# Patient Record
Sex: FEMALE | Race: WHITE | NOT HISPANIC OR LATINO | URBAN - METROPOLITAN AREA
[De-identification: names, ages, dates, MRNs, and addresses within clinical notes are randomized per-mention and may not be internally consistent; named-entity substitution may affect disease eponyms.]

---

## 2024-04-25 ENCOUNTER — OFFICE VISIT (OUTPATIENT)
Dept: DERMATOLOGY | Facility: CLINIC | Age: 59
End: 2024-04-25
Payer: COMMERCIAL

## 2024-04-25 DIAGNOSIS — L81.4 LENTIGO: ICD-10-CM

## 2024-04-25 DIAGNOSIS — L71.9 ROSACEA: ICD-10-CM

## 2024-04-25 DIAGNOSIS — D48.5 NEOPLASM OF UNCERTAIN BEHAVIOR OF SKIN: ICD-10-CM

## 2024-04-25 DIAGNOSIS — D18.01 HEMANGIOMA OF SKIN: ICD-10-CM

## 2024-04-25 DIAGNOSIS — Z85.828 PERSONAL HISTORY OF OTHER MALIGNANT NEOPLASM OF SKIN: Primary | ICD-10-CM

## 2024-04-25 DIAGNOSIS — L82.1 SEBORRHEIC KERATOSIS: ICD-10-CM

## 2024-04-25 DIAGNOSIS — L57.8 PHOTOAGING OF SKIN: ICD-10-CM

## 2024-04-25 PROCEDURE — 99214 OFFICE O/P EST MOD 30 MIN: CPT | Performed by: DERMATOLOGY

## 2024-04-25 PROCEDURE — 88305 TISSUE EXAM BY PATHOLOGIST: CPT | Performed by: DERMATOLOGY

## 2024-04-25 PROCEDURE — 11302 SHAVE SKIN LESION 1.1-2.0 CM: CPT | Performed by: DERMATOLOGY

## 2024-04-25 RX ORDER — METRONIDAZOLE 7.5 MG/G
CREAM TOPICAL
Qty: 45 G | Refills: 2 | Status: SHIPPED | OUTPATIENT
Start: 2024-04-25

## 2024-04-25 RX ORDER — RIBOFLAVIN (VITAMIN B2) 400 MG
400 TABLET ORAL
COMMUNITY
Start: 2024-01-19

## 2024-04-25 RX ORDER — ESTRADIOL AND NORETHINDRONE ACETATE 1; .5 MG/1; MG/1
1 TABLET ORAL
COMMUNITY
Start: 2024-02-16

## 2024-04-25 RX ORDER — CHOLECALCIFEROL (VITAMIN D3) 125 MCG
5000 CAPSULE ORAL
COMMUNITY
Start: 2022-11-15

## 2024-04-25 ASSESSMENT — DERMATOLOGY QUALITY OF LIFE (QOL) ASSESSMENT
RATE HOW EMOTIONALLY BOTHERED YOU ARE BY YOUR SKIN PROBLEM (FOR EXAMPLE, WORRY, EMBARRASSMENT, FRUSTRATION): 0 - NEVER BOTHERED
RATE HOW BOTHERED YOU ARE BY EFFECTS OF YOUR SKIN PROBLEMS ON YOUR ACTIVITIES (EG, GOING OUT, ACCOMPLISHING WHAT YOU WANT, WORK ACTIVITIES OR YOUR RELATIONSHIPS WITH OTHERS): 0 - NEVER BOTHERED
DATE THE QUALITY-OF-LIFE ASSESSMENT WAS COMPLETED: 66955
ARE THERE EXCLUSIONS OR EXCEPTIONS FOR THE QUALITY OF LIFE ASSESSMENT: NO
WHAT SINGLE SKIN CONDITION LISTED BELOW IS THE PATIENT ANSWERING THE QUALITY-OF-LIFE ASSESSMENT QUESTIONS ABOUT: NONE OF THE ABOVE

## 2024-04-25 ASSESSMENT — DERMATOLOGY PATIENT ASSESSMENT
DO YOU USE A TANNING BED: NO
ARE YOU TRYING TO GET PREGNANT: NO
ARE YOU AN ORGAN TRANSPLANT RECIPIENT: NO
HAVE YOU HAD OR DO YOU HAVE VASCULAR DISEASE: NO
ARE YOU ON BIRTH CONTROL: NO
DO YOU USE SUNSCREEN: DAILY
DO YOU HAVE ANY NEW OR CHANGING LESIONS: YES
DO YOU HAVE IRREGULAR MENSTRUAL CYCLES: NO
FOR PATIENTS COMING IN FOR A FOLLOW-UP VISIT - HAVE THERE BEEN ANY CHANGES IN YOUR HEALTH SINCE YOUR LAST VISIT: NO
HAVE YOU HAD OR DO YOU HAVE A STAPH INFECTION: NO

## 2024-04-25 ASSESSMENT — ITCH NUMERIC RATING SCALE: HOW SEVERE IS YOUR ITCHING?: 0

## 2024-04-25 ASSESSMENT — PATIENT GLOBAL ASSESSMENT (PGA): PATIENT GLOBAL ASSESSMENT: PATIENT GLOBAL ASSESSMENT:  2 - MILD

## 2024-04-25 NOTE — PROGRESS NOTES
Subjective     Nany Gtz is a 58 y.o. female who presents for the following: Skin Check (Pt here for FBSE with hx of BCC, ATN and AK. Pt reports areas of concern on left upper shoulder and left chest. ).     Review of Systems:  No other skin or systemic complaints other than what is documented elsewhere in the note.    The following portions of the chart were reviewed this encounter and updated as appropriate:         Skin Cancer History  History of BCC x 3 2022 and prior - mid chest, suprabrow, right posterior leg  History of atypical nevi      Specialty Problems    None       Objective   Well appearing patient in no apparent distress; mood and affect are within normal limits.    A full examination was performed including scalp, head, eyes, ears, nose, lips, neck, chest, axillae, abdomen, back, buttocks, bilateral upper extremities, bilateral lower extremities, hands, feet, fingers, toes, fingernails, and toenails. All findings within normal limits unless otherwise noted below.    Assessment/Plan   1. Personal history of other malignant neoplasm of skin (3)  Mid chest, Right posterior leg, suprabrow  Well healed scar at site of prior treatment without evidence of recurrence.    There is no evidence of recurrence on clinical examination today, reassurance was provided to the patient. The importance of sun protection was reviewed with the patient including the use of a broad spectrum sunscreen that protects against both UVA/UVB rays, with ingredients such as Zinc oxide or titanium dioxide, wearing sun protective clothing and sun avoidance. Warning signs of non-melanoma skin cancer were reviewed. ABCDEs of melanoma reviewed. Patient to f/u should they notice any new or changing pre-existing skin lesion    2. Neoplasm of uncertain behavior of skin  Left Shoulder - Anterior  0.7 x 0.6cm erythematous scaly patch          Shave removal    Lesion length (cm):  0.7  Lesion width (cm):  0.6  Margin per side (cm):   0.3  Lesion diameter (cm):  1.2  Informed consent: discussed and consent obtained    Timeout: patient name, date of birth, surgical site, and procedure verified    Procedure prep:  Patient was prepped and draped  Anesthesia: the lesion was anesthetized in a standard fashion    Anesthetic:  1% lidocaine w/ epinephrine 1-100,000 local infiltration  Instrument used: DermaBlade    Hemostasis achieved with: electrodesiccation    Outcome: patient tolerated procedure well    Post-procedure details: sterile dressing applied and wound care instructions given    Dressing type: bandage and petrolatum      Staff Communication: Dermatology Local Anesthesia: 1 % Lidocaine / Epinephrine - Amount:2cc    Specimen 1 - Dermatopathology- DERM LAB  Differential Diagnosis: bcc  Check Margins Yes/No?:  Yes  Comments:    Dermpath Lab: Routine Histopathology (formalin-fixed tissue)    Lesion concerning for BCC on the left shoulder.     Patient would like shave removal with margins checked. If present on margin then plan for ED&C, however if margins clear will defer further treatment.    The lesion will be traded for a scar and sent for pathology.  The risks include incomplete removal, repigmentation of the lesion after removal.    3. Photoaging of skin  Mottled pigmentation with telangiectasias and brown reticular macules in sun exposed areas of the body.    The risk of chronic, cumulative sun damage and risk of development of skin cancer was reviewed today.   The importance of sun protection was reviewed: including the use of a broad spectrum sunscreen that protects against both UVA/UVB rays, with ingredients such as Zinc oxide or titanium dioxide, wearing sun protective clothing and sun avoidance. We reviewed the warning signs of non-melanoma skin cancer and ABCDEs of melanoma  Please follow up should you notice any new or changing pre-existing skin lesion.    4. Rosacea  Head - Anterior (Face)  Mid face erythema with telangiectasias and  scattered inflammatory papules.    The chronic and intermittently flaring nature of the skin condition was discussed with the patient today. Discussed common triggers associated with rosacea including sun exposure, spicy foods, alcohol, and stress. The various treatment options and risks and benefits reviewed. Patient to begin metronidazole 0.75% cream 2x daily    metroNIDAZOLE (Metrocream) 0.75 % cream - Head - Anterior (Face)  Apply to face 2x daily    5. Seborrheic keratosis  Brown, tan waxy macules and stuck on appearing papules and plaques    The benign nature of these skin lesions reviewed, reassure provided and no further treatment needed at this time.   These lesions can be removed, if symptomatic (itching, bleeding, rubbing on clothing, painful), otherwise removal is considered cosmetic.     Discussed cost for cosmetic removal of lesions (on the abdomen, inframammary area)    6. Hemangioma of skin  Cherry red papules    The benign nature of these skin lesions were reviewed, no treatment is necessary.   Please follow up for any new or pre-existing lesion that is changing in size, shape, color, becomes painful, tender, itches or bleed.    7. Lentigo  Scattered tan macules in sun-exposed areas.    These are benign skin lesions due to sun exposure. They will darken in response to sun exposure. They should be monitored for change in size, shape or color.  These lesions can be treated cosmetically with topical creams, liquid nitrogen and a variety of lasers.      Follow up in 1 year

## 2024-04-29 LAB
LABORATORY COMMENT REPORT: NORMAL
PATH REPORT.FINAL DX SPEC: NORMAL
PATH REPORT.GROSS SPEC: NORMAL
PATH REPORT.MICROSCOPIC SPEC OTHER STN: NORMAL
PATH REPORT.RELEVANT HX SPEC: NORMAL
PATH REPORT.TOTAL CANCER: NORMAL

## 2024-09-26 ENCOUNTER — APPOINTMENT (OUTPATIENT)
Dept: DERMATOLOGY | Facility: CLINIC | Age: 59
End: 2024-09-26
Payer: COMMERCIAL

## 2024-09-26 DIAGNOSIS — R20.8 SKIN PAIN: ICD-10-CM

## 2024-09-26 DIAGNOSIS — L91.0 KELOID: Primary | ICD-10-CM

## 2024-09-26 PROCEDURE — 11900 INJECT SKIN LESIONS </W 7: CPT | Performed by: DERMATOLOGY

## 2024-09-26 RX ORDER — TRIAMCINOLONE ACETONIDE 40 MG/ML
20 INJECTION, SUSPENSION INTRA-ARTICULAR; INTRAMUSCULAR ONCE
Status: COMPLETED | OUTPATIENT
Start: 2024-09-26 | End: 2024-09-26

## 2024-09-26 ASSESSMENT — DERMATOLOGY QUALITY OF LIFE (QOL) ASSESSMENT
RATE HOW BOTHERED YOU ARE BY SYMPTOMS OF YOUR SKIN PROBLEM (EG, ITCHING, STINGING BURNING, HURTING OR SKIN IRRITATION): 2
RATE HOW EMOTIONALLY BOTHERED YOU ARE BY YOUR SKIN PROBLEM (FOR EXAMPLE, WORRY, EMBARRASSMENT, FRUSTRATION): 2
RATE HOW BOTHERED YOU ARE BY EFFECTS OF YOUR SKIN PROBLEMS ON YOUR ACTIVITIES (EG, GOING OUT, ACCOMPLISHING WHAT YOU WANT, WORK ACTIVITIES OR YOUR RELATIONSHIPS WITH OTHERS): 2
WHAT SINGLE SKIN CONDITION LISTED BELOW IS THE PATIENT ANSWERING THE QUALITY-OF-LIFE ASSESSMENT QUESTIONS ABOUT: KELOIDS
ARE THERE EXCLUSIONS OR EXCEPTIONS FOR THE QUALITY OF LIFE ASSESSMENT: NO

## 2024-09-26 ASSESSMENT — DERMATOLOGY PATIENT ASSESSMENT
WHERE ARE THESE NEW OR CHANGING LESIONS LOCATED: LEFT SHOULDER
DO YOU USE SUNSCREEN: DAILY
FOR PATIENTS COMING IN FOR A FOLLOW-UP VISIT - HAVE THERE BEEN ANY CHANGES IN YOUR HEALTH SINCE YOUR LAST VISIT: NO
DO YOU USE A TANNING BED: NO
HAVE YOU HAD OR DO YOU HAVE A STAPH INFECTION: NO
ARE YOU TRYING TO GET PREGNANT: NO
DO YOU HAVE ANY NEW OR CHANGING LESIONS: YES
ARE YOU AN ORGAN TRANSPLANT RECIPIENT: NO

## 2024-09-26 ASSESSMENT — PATIENT GLOBAL ASSESSMENT (PGA): PATIENT GLOBAL ASSESSMENT: PATIENT GLOBAL ASSESSMENT:  2 - MILD

## 2024-09-26 ASSESSMENT — ITCH NUMERIC RATING SCALE: HOW SEVERE IS YOUR ITCHING?: 3

## 2024-09-26 NOTE — PROGRESS NOTES
"Subjective     Nany Gtz is a 59 y.o. female who presents for the following: lesion (Pt here for what she believes is a keloid from previous biopsy (4/25/24). Left shoulder-anterior, superficial basal cell carcinoma, approximately 0.2mm from deep margin. Pt states area is raised hard and painful.).     Review of Systems:  No other skin or systemic complaints other than what is documented elsewhere in the note.    The following portions of the chart were reviewed this encounter and updated as appropriate:          Skin Cancer History  Biopsy Date Type Location Status   4/25/24 BCC, Superficial Left Shoulder - Anterior Treatment Complete  4/30/24       Specialty Problems    None       Objective   Well appearing patient in no apparent distress; mood and affect are within normal limits.    A focused skin examination was performed of the left shoulder. All findings within normal limits unless otherwise noted below.    Assessment/Plan   1. Keloid  Left Shoulder - Anterior  Round thickened pink plaque at biopsy site    Keloids are thick, raised scars that often occur secondary to trauma, surgery, piercings and at times may occur spontaneously.  Treatments are limited, however often do respond to intralesional kenalog(ILK). Keloids often require multiple intralesional kenalog treatments  Occasionally keloids can \"removed\" surgically, however they have a risk of recurrence and still often require intralesional kenalog after removal to prevent recurrence.     Intralesional kenalog (ILK) treatment may require multiple treatments and may cause skin discoloration, atrophy (thinning) of the skin.      Intralesional injection - Left Shoulder - Anterior  Intralesional Injection:   Date/Time: 9/26/2024 1:54 PM    Consent:     Consent obtained:  Verbal    Consent given by:  Patient    Risks discussed:  Pain and bleeding    Alternatives discussed:  No treatment and observation  Pre-Procedure Details:     Prep Type:  Isopropyl " alcohol  Procedure Details:   Injection:  Triamcinolone  Outcome: patient tolerated procedure well  Comments:  A total of 1 lesions were injected.  0.2 mL of 20mg/ml were injected.    triamcinolone acetonide (Kenalog-40) injection 20 mg - Left Shoulder - Anterior          Follow up in 1 month

## 2024-10-23 ENCOUNTER — APPOINTMENT (OUTPATIENT)
Dept: DERMATOLOGY | Facility: CLINIC | Age: 59
End: 2024-10-23
Payer: COMMERCIAL

## 2024-11-08 ENCOUNTER — APPOINTMENT (OUTPATIENT)
Dept: DERMATOLOGY | Facility: CLINIC | Age: 59
End: 2024-11-08
Payer: COMMERCIAL

## 2024-11-08 DIAGNOSIS — L91.0 KELOID: Primary | ICD-10-CM

## 2024-11-08 DIAGNOSIS — R20.8 SKIN PAIN: ICD-10-CM

## 2024-11-08 PROCEDURE — 11900 INJECT SKIN LESIONS </W 7: CPT | Performed by: DERMATOLOGY

## 2024-11-08 RX ORDER — TRIAMCINOLONE ACETONIDE 40 MG/ML
40 INJECTION, SUSPENSION INTRA-ARTICULAR; INTRAMUSCULAR ONCE
Status: COMPLETED | OUTPATIENT
Start: 2024-11-08 | End: 2024-11-08

## 2024-11-08 NOTE — PROGRESS NOTES
"Subjective     Nany Gtz is a 59 y.o. female who presents for the following: Keloid (Last visit 9/26/24 lesion was injected with kenalog 20mg/ml. Patient states initially better but then now worse, tender and painful and more raised. ).     Review of Systems:  No other skin or systemic complaints other than what is documented elsewhere in the note.    The following portions of the chart were reviewed this encounter and updated as appropriate:          Skin Cancer History  Biopsy Date Type Location Status   4/25/24 BCC, Superficial Left Shoulder - Anterior Treatment Complete  4/30/24       Specialty Problems    None       Objective   Well appearing patient in no apparent distress; mood and affect are within normal limits.    A focused skin examination was performed of the left shoulder. All findings within normal limits unless otherwise noted below.    Assessment/Plan   1. Keloid  Left Shoulder - Anterior  Round thickened pink plaque at biopsy site    Keloids are thick, raised scars that often occur secondary to trauma, surgery, piercings and at times may occur spontaneously.  Treatments are limited, however often do respond to intralesional kenalog(ILK). Keloids often require multiple intralesional kenalog treatments  Occasionally keloids can \"removed\" surgically, however they have a risk of recurrence and still often require intralesional kenalog after removal to prevent recurrence.     Intralesional kenalog (ILK) treatment may require multiple treatments and may cause skin discoloration, atrophy (thinning) of the skin.      Intralesional injection - Left Shoulder - Anterior  Intralesional Injection:   Date/Time: 11/8/2024 1:24 PM    Consent:     Consent obtained:  Verbal    Consent given by:  Patient    Risks discussed:  Pain and bleeding    Alternatives discussed:  No treatment and observation  Pre-Procedure Details:     Prep Type:  Isopropyl alcohol  Procedure Details:   Injection:  Triamcinolone  Outcome: " patient tolerated procedure well  Comments:  A total of 1 lesions were injected.  0.2 mL of 40mg/ml were injected.    triamcinolone acetonide (Kenalog-40) injection 40 mg - Left Shoulder - Anterior      Related Procedures  Follow Up In Dermatology - Established Patient

## 2024-12-03 PROBLEM — C44.91 BCC (BASAL CELL CARCINOMA OF SKIN): Status: ACTIVE | Noted: 2017-11-09

## 2024-12-04 ENCOUNTER — APPOINTMENT (OUTPATIENT)
Dept: DERMATOLOGY | Facility: CLINIC | Age: 59
End: 2024-12-04
Payer: COMMERCIAL

## 2024-12-04 DIAGNOSIS — R20.8 SKIN PAIN: ICD-10-CM

## 2024-12-04 DIAGNOSIS — L91.0 KELOID: Primary | ICD-10-CM

## 2024-12-04 PROCEDURE — 11900 INJECT SKIN LESIONS </W 7: CPT | Performed by: DERMATOLOGY

## 2024-12-04 RX ORDER — TRIAMCINOLONE ACETONIDE 40 MG/ML
40 INJECTION, SUSPENSION INTRA-ARTICULAR; INTRAMUSCULAR ONCE
Status: COMPLETED | OUTPATIENT
Start: 2024-12-04 | End: 2024-12-04

## 2024-12-04 NOTE — PROGRESS NOTES
"Subjective     Nany Gtz is a 59 y.o. female who presents for the following: Keloid (Follow up - Patient states keloid (left shoulder) feels better with a little itching, but looks the same.).     Review of Systems:  No other skin or systemic complaints other than what is documented elsewhere in the note.    The following portions of the chart were reviewed this encounter and updated as appropriate:          Skin Cancer History  Biopsy Date Type Location Status   4/25/24 BCC, Superficial Left Shoulder - Anterior Treatment Complete  4/30/24       Specialty Problems          Dermatology Problems    BCC (basal cell carcinoma of skin)        Objective   Well appearing patient in no apparent distress; mood and affect are within normal limits.    A focused skin examination was performed. All findings within normal limits unless otherwise noted below.    Assessment/Plan   1. Keloid  Left Shoulder - Anterior  Round thickened pink plaque at biopsy site    Keloids are thick, raised scars that often occur secondary to trauma, surgery, piercings and at times may occur spontaneously.  Treatments are limited, however often do respond to intralesional kenalog(ILK). Keloids often require multiple intralesional kenalog treatments  Occasionally keloids can \"removed\" surgically, however they have a risk of recurrence and still often require intralesional kenalog after removal to prevent recurrence.     Intralesional kenalog (ILK) treatment may require multiple treatments and may cause skin discoloration, atrophy (thinning) of the skin.    Elected for ILK # 3, (#2 with K40mg/ml)     Intralesional injection - Left Shoulder - Anterior  Intralesional Injection:   Date/Time: 12/4/2024 12:58 PM    Consent:     Consent obtained:  Verbal    Consent given by:  Patient    Risks discussed:  Pain and bleeding    Alternatives discussed:  No treatment and observation  Pre-Procedure Details:     Prep Type:  Isopropyl alcohol  Procedure " Details:   Injection:  Triamcinolone  Outcome: patient tolerated procedure well  Comments:  A total of 1 lesions were injected.  0.3 mL of 40mg/ml were injected.    triamcinolone acetonide (Kenalog-40) injection 40 mg - Left Shoulder - Anterior      Prior Authorization for Laser destruction of cutaneous vascular proliferative lesions - Left Shoulder - Anterior    Of note patient mentioned benign keratoses on the abdomen - recommended LN2 and advised lower risk of development of keloids      Follow up in 1 month.

## 2025-01-10 ENCOUNTER — APPOINTMENT (OUTPATIENT)
Dept: DERMATOLOGY | Facility: CLINIC | Age: 60
End: 2025-01-10
Payer: COMMERCIAL

## 2025-01-10 DIAGNOSIS — L82.0 INFLAMED SEBORRHEIC KERATOSIS: ICD-10-CM

## 2025-01-10 DIAGNOSIS — L91.0 KELOID: Primary | ICD-10-CM

## 2025-01-10 DIAGNOSIS — R20.8 SKIN PAIN: ICD-10-CM

## 2025-01-10 DIAGNOSIS — L81.4 LENTIGO: ICD-10-CM

## 2025-01-10 RX ORDER — TRIAMCINOLONE ACETONIDE 40 MG/ML
40 INJECTION, SUSPENSION INTRA-ARTICULAR; INTRAMUSCULAR ONCE
Status: COMPLETED | OUTPATIENT
Start: 2025-01-10 | End: 2025-01-10

## 2025-01-10 RX ADMIN — TRIAMCINOLONE ACETONIDE 40 MG: 40 INJECTION, SUSPENSION INTRA-ARTICULAR; INTRAMUSCULAR at 13:08

## 2025-01-10 NOTE — PROGRESS NOTES
"Subjective     Nany Gtz is a 59 y.o. female who presents for the following: Keloid (1 month follow up LV 12/04/24 Patient states keloid has had some improvement.). Patient reports some pruritic lesions on the abdomen and back. She also has a brown spot on the right cheek that is no changing or symptomatic.    Review of Systems:  No other skin or systemic complaints other than what is documented elsewhere in the note.    The following portions of the chart were reviewed this encounter and updated as appropriate:          Skin Cancer History  Biopsy Date Type Location Status   4/25/24 BCC, Superficial Left Shoulder - Anterior Treatment Complete  4/30/24       Specialty Problems          Dermatology Problems    BCC (basal cell carcinoma of skin)        Objective   Well appearing patient in no apparent distress; mood and affect are within normal limits.    A focused skin examination was performed of the left shoulder, abdomen, back, face. All findings within normal limits unless otherwise noted below.    Assessment/Plan   1. Keloid  Left Shoulder - Anterior  Round thickened pink plaque at biopsy site    Keloids are thick, raised scars that often occur secondary to trauma, surgery, piercings and at times may occur spontaneously.  Treatments are limited, however often do respond to intralesional kenalog(ILK). Keloids often require multiple intralesional kenalog treatments  Occasionally keloids can \"removed\" surgically, however they have a risk of recurrence and still often require intralesional kenalog after removal to prevent recurrence.     Intralesional kenalog (ILK) treatment may require multiple treatments and may cause skin discoloration, atrophy (thinning) of the skin.    Elected for ILK # 4, (#2 and #3 with K40mg/ml)     Pending prior authorization approval of laser for treatment of keloid. Patient is scheduled for laser treatment on 2/25/25    Intralesional injection - Left Shoulder - " "Anterior  Intralesional Injection:   Consent:     Consent obtained:  Verbal    Consent given by:  Patient    Risks discussed:  Pain and bleeding    Alternatives discussed:  No treatment and observation  Pre-Procedure Details:     Prep Type:  Isopropyl alcohol  Procedure Details:   Injection:  Triamcinolone  Outcome: patient tolerated procedure well  Comments:  A total of 1 lesions were injected.  0.2 mL of 40mg/ml were injected.    triamcinolone acetonide (Kenalog-40) injection 40 mg - Left Shoulder - Anterior      Related Procedures  Prior Authorization for Laser destruction of cutaneous vascular proliferative lesions    2. Lentigo  Right Malar Cheek  Scattered tan macules in sun-exposed areas.    These are benign skin lesions due to sun exposure. They will darken in response to sun exposure. They should be monitored for change in size, shape or color.  These lesions can be treated cosmetically with topical creams, liquid nitrogen and a variety of lasers.    Patient elected for LN2 of this lesion. This was performed as courtesy.    The risks and benefits of LN2 were reviewed including incomplete removal, crusting, blister hypo and/or hyperpigmentation, scarring and recurrence.      Destr of lesion - Right Malar Cheek  Complexity: simple    Destruction method: cryotherapy    Informed consent: discussed and consent obtained    Lesion destroyed using liquid nitrogen: Yes    Cryotherapy cycles:  1  Outcome: patient tolerated procedure well with no complications    Post-procedure details: wound care instructions given      3. Inflamed seborrheic keratosis (7)  Left Abdomen (side) - Upper (4), Left Lower Back, Right Breast, Right Lower Back  pink and brown \"stuck on\" verrucous scaly papule with surrounding erythema    The benign nature of these skin lesions reviewed, reassure provided and no further treatment needed at this time.   These lesions can be removed, if symptomatic (itching, bleeding, rubbing on clothing, " painful), otherwise removal is considered cosmetic.     Patient elected for LN2 for these lesions. See procedure note    The risks and benefits of LN2 were reviewed including incomplete removal, crusting, blister hypo and/or hyperpigmentation, scarring and recurrence.      Destr of lesion - Left Abdomen (side) - Upper (4), Left Lower Back, Right Breast, Right Lower Back  Complexity: simple    Destruction method: cryotherapy    Informed consent: discussed and consent obtained    Lesion destroyed using liquid nitrogen: Yes    Cryotherapy cycles:  2  Outcome: patient tolerated procedure well with no complications    Post-procedure details: wound care instructions given        Follow up as scheduled for laser visit    Willi Desai MD   [id number], Department of Dermatology    I was present for the entirety of the procedure(s).  I performed the procedures. I saw and evaluated the patient. I personally obtained the key and critical portions of the history and physical exam or was physically present for key and critical portions performed by the resident/fellow. I reviewed the resident/fellow's documentation and discussed the patient with the resident/fellow. I agree with the resident/fellow's medical decision making as documented in the note.    Adriana Lord DO

## 2025-01-28 NOTE — PROGRESS NOTES
Fiona Heck MD   Adult Reconstruction and Joint Replacement Surgery  Phone: [phone]     Fax: [phone]       Name: Nany Gtz  Age: 59 y.o.   : 1965   Date of Visit: 2025        INITIAL CONSULTATION    CC: Right knee pain    Clinical History:  This patient presents with 4 months of RIGHT  knee pain.     Patient does have pain at night. Patient does not report falls related to this problem. Patient is able to walk unlimited blocks. Patient is currently using nothing as assistive device. Primarily complains of medial pain. The pain is significantly impacting their ability to perform activities of daily living. Patient reports no longer able to do activities such as tennis, Pilates. Patient has difficulty with climbing stairs and getting up from a chair. Patient has tried the following Activity modification, NSAIDs, Tylenol (arthritis dosing) , and Xray. Date of last steroid injection: Never.     The patient does endorse some mechanical symptoms including stiffness and popping.  She occasionally has catching.  She does not endorse a naga locking experience.    Focused History  PMH: Reviewed and PE/DVT: no  PSH: Reviewed , Hip/Knee replacement: no, Hip/Knee surgery: no, Anesthesia complications: no, Spine surgery: no, Surgical infection: no, and Weight loss surgery: no  SHx: Reviewed, Occupation: Accounting, Current smoker: no, EtOH intake weekly: None, Social support: unk, and Preferred physical activities: Tennis, Pilates  Jehovah´s Witness: no  Meds: Reviewed, Current Anticoagulants: no, Weight loss medication: no, and Current Opioids: no  Allergies: Reviewed  and The patient reports no contraindications or allergies to cephalosporins, aspirin, NSAIDs or opioids, except as noted above.  Dental Hx: Last routine cleaning: April 15, 2025 and All invasive dental work must be completed 3+ months prior to joint replacement surgery. Dental cleaning must be completed 6+ weeks prior to  joint replacement surgery. Patient are to avoid any invasive dental work 3-6 months post-surgically.   FH: No family history of any bleeding or clotting disorders.    PROMs/HISTORY   PROMs   No questionnaires on file.     Past Medical History:   Diagnosis Date    BCC (basal cell carcinoma of skin) 11/09/2017    BCC (basal cell carcinoma of skin) 04/08/2022    BCC (basal cell carcinoma of skin) 04/25/2024       Past Medical History:   Diagnosis Date    BCC (basal cell carcinoma of skin) 11/09/2017    BCC (basal cell carcinoma of skin) 04/08/2022    BCC (basal cell carcinoma of skin) 04/25/2024     Documented in chart and reviewed.     History reviewed. No pertinent surgical history.    Allergies: She has No Known Allergies.     Medications:  Current Outpatient Medications   Medication Instructions    cholecalciferol (VITAMIN D-3) 5,000 Units, Daily RT    estradiol-norethindrone acet (ActivElla) 1-0.5 mg tablet 1 tablet, Daily RT    metroNIDAZOLE (Metrocream) 0.75 % cream Apply to face 2x daily    riboflavin (VITAMIN B-2) 400 mg, Daily RT       No family history on file.  Documented in chart and reviewed.     Social History     Tobacco Use    Smoking status: Not on file    Smokeless tobacco: Not on file   Substance Use Topics    Alcohol use: Not on file        Review of Systems: Review of systems completed with medical assistant intake. Please refer to this note.     Physical Exam:  BMI: Not assessed.    General: The patient is well appearing and has an appropriate affect.     Neurological Examination: SILT in SPN/DPN/Sural/Saphenous/Tibial nerves. 5/5 EHL, FHL, Tibial anterior, Gastrocnemius. Coordination grossly intact.     Cardiovascular Exam: Capillary refill <2 seconds.     Lymphatic Examination: There is no obvious lymphatic swelling present around the involved joint.    Skin Exam: Skin around the pertinent joint is without evidence of infection or rash.    Gait: The patient ambulates with an antalgic gait.  "    Lumbar spine:    No tenderness to palpation midline.    Negative straight leg raise bilaterally.    Right Hip Examination:    There is no tenderness over the greater trochanter.    Range of motion is full extension to 100 degrees of flexion.    The hip is stable without subluxation or dislocation.    The hip internally rotates to 15 degrees and externally rotates to 45 degrees.    There is no pain with hip motion.    Left Hip Examination:    There is no tenderness over the greater trochanter.    Range of motion is full extension to 100 degrees of flexion.    The hip is stable without subluxation or dislocation.    The hip internally rotates to 15 degrees and externally rotates to 45 degrees.    There is no pain with hip motion.    Right Knee Examination:  Examination of the knee reveals the skin to be intact.    There is a small effusion in the knee.    The alignment of the knee is neutral.    This deformity is correctable.    There is tenderness to palpation over the joint line.    There is significant quadriceps atrophy.    Range of Motion: 5 to 130 degrees of flexion.    The knee is stable to varus-valgus stress and anterior-posterior stress.     There is mild grinding with range of motion.    There is mild patellofemoral crepitus.    There is a positive Erin test.    Left Knee Examination:  Examination of the knee reveals the skin to be intact.     There is no obvious swelling.    There is a no effusion in the knee.     The alignment of the knee is normal.    There is no tenderness to palpation over the joint line.    There is no significant quadriceps atrophy.    Range of motion is full extension to 120 degrees of flexion.    The knee is stable to varus-valgus stress and anterior-posterior stress.     There is no grinding with range of motion.    There is no patellofemoral crepitus.    Prior Labs:   Prior Labs:   No results found for: \"WBC\", \"HGB\", \"HCT\", \"MCV\", \"PLT\"   No results found for: \"INR\", " "\"PROTIME\"      No results found for: \"GLUCOSE\", \"CALCIUM\", \"NA\", \"K\", \"CO2\", \"CL\", \"BUN\", \"CREATININE\"   No results found for: \"CKTOTAL\", \"CKMB\", \"CKMBINDEX\", \"TROPONINI\"   Lab Results   Component Value Date    HGBA1C 5.6 10/05/2021         No results found for: \"CRP\"   No results found for: \"SEDRATE\"      Radiographs:  Radiographs were personally reviewed today. There is evidence of mild  BILATERAL knee osteoarthritis without bone on bone apposition.    Impression:  This patient presents with mild  BILATERAL knee osteoarthritis without bone on bone apposition.     Diagnosis:  Primary osteoarthritis of right knee    Knee pain, unspecified chronicity, unspecified laterality     Recommendations / Plan:    I have discussed the options in detail with the patient. We have discussed anti-inflammatory medication, activity modification, physical therapy, corticosteroid injections, viscosupplementation injections, partial knee replacement surgery and total knee replacement surgery.  There is no indication for surgery this time.    The risks and benefits of all these treatment options have been discussed in detail.     The patient has tried at least 3 months of the above conservative treatments and continues to have disabling pain, impaired activities of daily living and worsened quality of life.  Reviewed the surgical optimization steps to optimize their chances for a successful joint replacement surgery.      A physical therapy prescription was ordered for the patient.  Patient will continue their home exercise program. Strategies for pain management using over-the-counter anti-inflammatory medications reviewed.  The patient elects for a steroid injection, which was provided according to procedure note below. The patient was fit for a brace today -non custom medial . Encouraged them to maintain range of motion and strength around the knee joints.  They will continue to implement these strategies in addressing their " pain.       Recommend the patient continue optimizing nonsurgical treatment interventions as outlined above for management of their knee arthritis.  I would be happy to see them again at any point to discuss surgery if indicated or they are more optimized or to review progress with nonsurgical treatment of arthritis. The patient verbalizes understanding with the recommendations and treatment plan as outlined above and is in agreement.  Questions were addressed.    Large Joint Injection 20610: Knee  Consent given by: Patient  Timeout: Immediately prior to procedure the correct patient, procedure, and site was verified. Sterile gloves and semi-sterile technique were used.   Indications: Knee pain and joint swelling.   Location: RIGHT knee  Needle size: 22 G  Approach: Lateral    Medications administered: please see medical assistant note for lot number and expiration date.   Subcutaneous   4 ml of 1% lidocaine     Deep   4 ml of 1% lidocaine   4 mL 0.5% marcaine   2 mL of 40 mg kenalog     Patient tolerance: Dressing applied. Patient tolerated the procedure well with no immediate complications.    L Inj/Asp: R knee on 1/30/2025 5:35 AM  Indications: pain and joint swelling  Details: 22 G needle, lateral approach  Medications: 80 mg triamcinolone acetonide 40 mg/mL; 8 mL lidocaine 10 mg/mL (1 %); 4 mL BUPivacaine HCl 0.5 % (5 mg/mL)  Outcome: tolerated well, no immediate complications  Procedure, treatment alternatives, risks and benefits explained, specific risks discussed. Consent was given by the patient. Immediately prior to procedure a time out was called to verify the correct patient, procedure, equipment, support staff and site/side marked as required. Patient was prepped and draped in the usual sterile fashion.             _____________  Fiona Heck MD   Attending Orthopaedic Surgeon  [facility]    [facility]       This office note was transcribed with  dictation software.  Please excuse any typographical errors, program misunderstandings leading to inadvertent insertions or deletions of inappropriate wording, pronoun errors and other unintentional transcription errors not noticed on proof-reading.

## 2025-01-29 ENCOUNTER — OFFICE VISIT (OUTPATIENT)
Dept: ORTHOPEDIC SURGERY | Facility: HOSPITAL | Age: 60
End: 2025-01-29
Payer: COMMERCIAL

## 2025-01-29 ENCOUNTER — HOSPITAL ENCOUNTER (OUTPATIENT)
Dept: RADIOLOGY | Facility: HOSPITAL | Age: 60
Discharge: HOME | End: 2025-01-29
Payer: COMMERCIAL

## 2025-01-29 DIAGNOSIS — M25.569 KNEE PAIN, UNSPECIFIED CHRONICITY, UNSPECIFIED LATERALITY: ICD-10-CM

## 2025-01-29 DIAGNOSIS — M17.11 PRIMARY OSTEOARTHRITIS OF RIGHT KNEE: Primary | ICD-10-CM

## 2025-01-29 PROCEDURE — 99215 OFFICE O/P EST HI 40 MIN: CPT | Mod: 25 | Performed by: STUDENT IN AN ORGANIZED HEALTH CARE EDUCATION/TRAINING PROGRAM

## 2025-01-29 PROCEDURE — 2500000004 HC RX 250 GENERAL PHARMACY W/ HCPCS (ALT 636 FOR OP/ED): Performed by: STUDENT IN AN ORGANIZED HEALTH CARE EDUCATION/TRAINING PROGRAM

## 2025-01-29 PROCEDURE — 99205 OFFICE O/P NEW HI 60 MIN: CPT | Performed by: STUDENT IN AN ORGANIZED HEALTH CARE EDUCATION/TRAINING PROGRAM

## 2025-01-29 PROCEDURE — 73564 X-RAY EXAM KNEE 4 OR MORE: CPT | Mod: 50

## 2025-01-29 PROCEDURE — 73564 X-RAY EXAM KNEE 4 OR MORE: CPT | Mod: BILATERAL PROCEDURE | Performed by: RADIOLOGY

## 2025-01-29 PROCEDURE — L1812 KO ELASTIC W/JOINTS PRE OTS: HCPCS | Performed by: STUDENT IN AN ORGANIZED HEALTH CARE EDUCATION/TRAINING PROGRAM

## 2025-01-29 PROCEDURE — 20610 DRAIN/INJ JOINT/BURSA W/O US: CPT | Mod: RT | Performed by: STUDENT IN AN ORGANIZED HEALTH CARE EDUCATION/TRAINING PROGRAM

## 2025-01-30 PROCEDURE — 20610 DRAIN/INJ JOINT/BURSA W/O US: CPT | Mod: RT | Performed by: STUDENT IN AN ORGANIZED HEALTH CARE EDUCATION/TRAINING PROGRAM

## 2025-01-30 PROCEDURE — 2500000004 HC RX 250 GENERAL PHARMACY W/ HCPCS (ALT 636 FOR OP/ED): Performed by: STUDENT IN AN ORGANIZED HEALTH CARE EDUCATION/TRAINING PROGRAM

## 2025-01-30 RX ORDER — TRIAMCINOLONE ACETONIDE 40 MG/ML
80 INJECTION, SUSPENSION INTRA-ARTICULAR; INTRAMUSCULAR
Status: COMPLETED | OUTPATIENT
Start: 2025-01-30 | End: 2025-01-30

## 2025-01-30 RX ORDER — LIDOCAINE HYDROCHLORIDE 10 MG/ML
8 INJECTION, SOLUTION INFILTRATION; PERINEURAL
Status: COMPLETED | OUTPATIENT
Start: 2025-01-30 | End: 2025-01-30

## 2025-01-30 RX ORDER — BUPIVACAINE HYDROCHLORIDE 5 MG/ML
4 INJECTION, SOLUTION PERINEURAL
Status: COMPLETED | OUTPATIENT
Start: 2025-01-30 | End: 2025-01-30

## 2025-01-30 RX ADMIN — LIDOCAINE HYDROCHLORIDE 8 ML: 10 INJECTION, SOLUTION INFILTRATION; PERINEURAL at 05:35

## 2025-01-30 RX ADMIN — BUPIVACAINE HYDROCHLORIDE 4 ML: 5 INJECTION, SOLUTION PERINEURAL at 05:35

## 2025-01-30 RX ADMIN — TRIAMCINOLONE ACETONIDE 80 MG: 40 INJECTION, SUSPENSION INTRA-ARTICULAR; INTRAMUSCULAR at 05:35

## 2025-02-25 ENCOUNTER — APPOINTMENT (OUTPATIENT)
Dept: DERMATOLOGY | Facility: CLINIC | Age: 60
End: 2025-02-25
Payer: COMMERCIAL

## 2025-03-25 ENCOUNTER — APPOINTMENT (OUTPATIENT)
Dept: DERMATOLOGY | Facility: CLINIC | Age: 60
End: 2025-03-25
Payer: COMMERCIAL

## 2025-03-25 DIAGNOSIS — R20.8 SKIN PAIN: ICD-10-CM

## 2025-03-25 DIAGNOSIS — Z41.9 ELECTIVE SURGERY FOR PURPOSES OTHER THAN TREATING HEALTH CONDITIONS: ICD-10-CM

## 2025-03-25 DIAGNOSIS — L91.0 KELOID: Primary | ICD-10-CM

## 2025-03-25 PROCEDURE — DTLTS LASER TEST SPOT: Performed by: DERMATOLOGY

## 2025-03-25 NOTE — PROGRESS NOTES
"Subjective     Nany Gtz is a 59 y.o. female who presents for the following: Cosmetic (Here for treatment with PDL for keloid. Has had ILK with improvement, still discolored, slightly itchy. She had PA but they did not process and will need new PA today. Patient willing to pay cosmetically today. ).     Review of Systems:  No other skin or systemic complaints other than what is documented elsewhere in the note.    The following portions of the chart were reviewed this encounter and updated as appropriate:          Skin Cancer History  Biopsy Date Type Location Status   4/25/24 BCC, Superficial Left Shoulder - Anterior Treatment Complete  4/30/24       Specialty Problems          Dermatology Problems    BCC (basal cell carcinoma of skin)        Objective   Well appearing patient in no apparent distress; mood and affect are within normal limits.    A focused skin examination was performed. All findings within normal limits unless otherwise noted below.    Assessment/Plan   1. Cosmetic Treatment of Keloid  Left Shoulder - Anterior  Pink scar with prominent vessels on the left anterior shoulder    Keloids are thick, raised scars that often occur secondary to trauma, surgery, piercings and at times may occur spontaneously.  Treatments are limited, however often do respond to intralesional kenalog(ILK). Keloids often require multiple intralesional kenalog treatments  Occasionally keloids can \"removed\" surgically, however they have a risk of recurrence and still often require intralesional kenalog after removal to prevent recurrence.     S/p 4 ILK treatments    Will resent prior authorization approval of laser for treatment of keloid.     Today given no PA was performed cosmetically, pt aware of out of pocket cost    Destruction of CVP lesion - Left Shoulder - Anterior  Lesion area (sq cm):  1.5  Informed consent: discussed and consent obtained    Timeout: patient name, date of birth, surgical site, and procedure " verified    Procedure prep:  Patient prepped in sterile fashion  Prep type:  Isopropyl alcohol  Eye shield: goggles    Laser type:  PDL (VBeam)  Treatment number:  1  Fluence (J/sq cm):  7  Spot size (mm):  7  Pulse duration (ms):  1.5  Pulse stacking: Yes    Dynamic cooling: Yes (DCD Spray/Delay 30/20)    Surface area treated (sq cm):  2  Outcome: patient tolerated procedure well with no complications    Additional details:  Pulse # 9    Prior Authorization for Laser destruction of cutaneous vascular proliferative lesions - Left Shoulder - Anterior

## 2025-04-29 ENCOUNTER — APPOINTMENT (OUTPATIENT)
Dept: DERMATOLOGY | Facility: CLINIC | Age: 60
End: 2025-04-29
Payer: COMMERCIAL

## 2025-04-29 DIAGNOSIS — L91.0 KELOID: ICD-10-CM

## 2025-04-29 DIAGNOSIS — Z41.9 ELECTIVE SURGERY FOR PURPOSES OTHER THAN TREATING HEALTH CONDITIONS: Primary | ICD-10-CM

## 2025-04-29 PROCEDURE — DTLTS LASER TEST SPOT: Performed by: DERMATOLOGY

## 2025-04-29 ASSESSMENT — ITCH NUMERIC RATING SCALE: HOW SEVERE IS YOUR ITCHING?: 2

## 2025-04-29 NOTE — Clinical Note
"Keloids are thick, raised scars that often occur secondary to trauma, surgery, piercings and at times may occur spontaneously.  Treatments are limited, however often do respond to intralesional kenalog(ILK). Keloids often require multiple intralesional kenalog treatments  Occasionally keloids can \"removed\" surgically, however they have a risk of recurrence and still often require intralesional kenalog after removal to prevent recurrence.     S/p 4 ILK treatments - improvement in thickness    S/p 1 PDL treatment, PDL denied by insurance with some improvement    Laser Treatment:  Destruction of CVP lesion - Left Shoulder - Anterior - telangiectasias  Lesion area (sq cm):  1.5  Informed consent: discussed and consent obtained    Timeout: patient name, date of birth, surgical site, and procedure verified    Procedure prep:  Patient prepped in sterile fashion  Prep type:  Isopropyl alcohol  Eye shield: goggles    Laser type:  PDL (VBeam)  Treatment number:  2  Fluence (J/sq cm):  11   Spot size (mm):  3x10  Pulse duration (ms):  10  Pulse stacking: Yes    Dynamic cooling: Yes (DCD Spray/Delay 30/20)    Surface area treated (sq cm):  1.5  Outcome: patient tolerated procedure well with no complications    Additional details:  Pulse # 2    Destruction of CVP lesion - Left Shoulder - Anterior - telangiectasias  Lesion area (sq cm):  1.5  Informed consent: discussed and consent obtained    Timeout: patient name, date of birth, surgical site, and procedure verified    Procedure prep:  Patient prepped in sterile fashion  Prep type:  Isopropyl alcohol  Eye shield: goggles    Laser type:  PDL (VBeam)  Treatment number:  2  Fluence (J/sq cm):  4  Spot size (mm):  10  Pulse duration (ms):  0.45  Pulse stacking: Yes    Dynamic cooling: Yes (DCD Spray/Delay 30/20)    Surface area treated (sq cm):  1.5  Outcome: patient tolerated procedure well with no complications    Additional details:  Pulse # 4    Consider 3rd treatment, however " would suspect if no change at follow up further treatments unlikely to be beneficial.

## 2025-04-29 NOTE — PROGRESS NOTES
"Subjective     Nany Gtz is a 59 y.o. female who presents for the following: Cosmetic (Pt here for #2 V-Beam treatment for Keloid, located left shoulder anterior. Pt has tried ILK x4 with improvement of itching and thickness, discoloration remains. PA was denied with insurance for PDL.).     Review of Systems:  No other skin or systemic complaints other than what is documented elsewhere in the note.    The following portions of the chart were reviewed this encounter and updated as appropriate:          Skin Cancer History  Biopsy Log Book  Biopsied Type Location Status   4/25/24 BCC, Superficial Left Shoulder - Anterior Treatment Complete  4/30/24       Additional History      Specialty Problems          Dermatology Problems    BCC (basal cell carcinoma of skin)        Objective   Well appearing patient in no apparent distress; mood and affect are within normal limits.    A focused skin examination was performed. All findings within normal limits unless otherwise noted below.    Assessment/Plan   Skin Exam  1. KELOID  Left Shoulder - Anterior  Pink scar with prominent vessels on the left anterior shoulder, slightly improved from 3/25/25  Keloids are thick, raised scars that often occur secondary to trauma, surgery, piercings and at times may occur spontaneously.  Treatments are limited, however often do respond to intralesional kenalog(ILK). Keloids often require multiple intralesional kenalog treatments  Occasionally keloids can \"removed\" surgically, however they have a risk of recurrence and still often require intralesional kenalog after removal to prevent recurrence.     S/p 4 ILK treatments - improvement in thickness    S/p 1 PDL treatment, PDL denied by insurance with some improvement    Laser Treatment:  Destruction of CVP lesion - Left Shoulder - Anterior - telangiectasias  Lesion area (sq cm):  1.5  Informed consent: discussed and consent obtained    Timeout: patient name, date of birth, surgical " site, and procedure verified    Procedure prep:  Patient prepped in sterile fashion  Prep type:  Isopropyl alcohol  Eye shield: goggles    Laser type:  PDL (VBeam)  Treatment number:  2  Fluence (J/sq cm):  11   Spot size (mm):  3x10  Pulse duration (ms):  10  Pulse stacking: Yes    Dynamic cooling: Yes (DCD Spray/Delay 30/20)    Surface area treated (sq cm):  1.5  Outcome: patient tolerated procedure well with no complications    Additional details:  Pulse # 2    Destruction of CVP lesion - Left Shoulder - Anterior - telangiectasias  Lesion area (sq cm):  1.5  Informed consent: discussed and consent obtained    Timeout: patient name, date of birth, surgical site, and procedure verified    Procedure prep:  Patient prepped in sterile fashion  Prep type:  Isopropyl alcohol  Eye shield: goggles    Laser type:  PDL (VBeam)  Treatment number:  2  Fluence (J/sq cm):  4  Spot size (mm):  10  Pulse duration (ms):  0.45  Pulse stacking: Yes    Dynamic cooling: Yes (DCD Spray/Delay 30/20)    Surface area treated (sq cm):  1.5  Outcome: patient tolerated procedure well with no complications    Additional details:  Pulse # 4    Consider 3rd treatment, however would suspect if no change at follow up further treatments unlikely to be beneficial.     Related Procedures  Prior Authorization for Laser destruction of cutaneous vascular proliferative lesions

## 2025-06-11 ENCOUNTER — APPOINTMENT (OUTPATIENT)
Dept: DERMATOLOGY | Facility: CLINIC | Age: 60
End: 2025-06-11

## 2025-06-11 DIAGNOSIS — L91.0 KELOID: Primary | ICD-10-CM

## 2025-06-11 DIAGNOSIS — Z41.9 ELECTIVE SURGERY FOR PURPOSES OTHER THAN TREATING HEALTH CONDITIONS: ICD-10-CM

## 2025-06-11 PROCEDURE — 1036F TOBACCO NON-USER: CPT | Performed by: DERMATOLOGY

## 2025-06-11 PROCEDURE — DTLTS LASER TEST SPOT: Performed by: DERMATOLOGY

## 2025-06-11 ASSESSMENT — DERMATOLOGY QUALITY OF LIFE (QOL) ASSESSMENT
WHAT SINGLE SKIN CONDITION LISTED BELOW IS THE PATIENT ANSWERING THE QUALITY-OF-LIFE ASSESSMENT QUESTIONS ABOUT: ALOPECIA
RATE HOW EMOTIONALLY BOTHERED YOU ARE BY YOUR SKIN PROBLEM (FOR EXAMPLE, WORRY, EMBARRASSMENT, FRUSTRATION): 1
ARE THERE EXCLUSIONS OR EXCEPTIONS FOR THE QUALITY OF LIFE ASSESSMENT: NO
RATE HOW BOTHERED YOU ARE BY SYMPTOMS OF YOUR SKIN PROBLEM (EG, ITCHING, STINGING BURNING, HURTING OR SKIN IRRITATION): 1
RATE HOW BOTHERED YOU ARE BY EFFECTS OF YOUR SKIN PROBLEMS ON YOUR ACTIVITIES (EG, GOING OUT, ACCOMPLISHING WHAT YOU WANT, WORK ACTIVITIES OR YOUR RELATIONSHIPS WITH OTHERS): 1

## 2025-06-11 ASSESSMENT — DERMATOLOGY PATIENT ASSESSMENT
ARE YOU ON BIRTH CONTROL: YES
ARE YOU AN ORGAN TRANSPLANT RECIPIENT: NO
DO YOU HAVE ANY NEW OR CHANGING LESIONS: NO
DO YOU HAVE IRREGULAR MENSTRUAL CYCLES: NO
HAVE YOU HAD OR DO YOU HAVE VASCULAR DISEASE: NO
DO YOU USE A TANNING BED: NO
HAVE YOU HAD OR DO YOU HAVE A STAPH INFECTION: NO
DO YOU USE SUNSCREEN: DAILY
ARE YOU TRYING TO GET PREGNANT: NO
FOR PATIENTS COMING IN FOR A FOLLOW-UP VISIT - HAVE THERE BEEN ANY CHANGES IN YOUR HEALTH SINCE YOUR LAST VISIT: ALL IN MYCHART

## 2025-06-11 ASSESSMENT — ITCH NUMERIC RATING SCALE: HOW SEVERE IS YOUR ITCHING?: 0

## 2025-06-11 ASSESSMENT — PATIENT GLOBAL ASSESSMENT (PGA): PATIENT GLOBAL ASSESSMENT: PATIENT GLOBAL ASSESSMENT:  2 - MILD

## 2025-06-11 NOTE — Clinical Note
"Keloids are thick, raised scars that often occur secondary to trauma, surgery, piercings and at times may occur spontaneously.  Treatments are limited, however often do respond to intralesional kenalog(ILK). Keloids often require multiple intralesional kenalog treatments  Occasionally keloids can \"removed\" surgically, however they have a risk of recurrence and still often require intralesional kenalog after removal to prevent recurrence.     S/p 4 ILK treatments - improvement in thickness    S/p 2 PDL treatment, PDL denied by insurance with some improvement    Laser Treatment:  Destruction of CVP lesion - Left Shoulder - Anterior - telangiectasias  Lesion area (sq cm):  1.5  Informed consent: discussed and consent obtained    Timeout: patient name, date of birth, surgical site, and procedure verified    Procedure prep:  Patient prepped in sterile fashion  Prep type:  cooling gel  Eye shield: goggles    Laser type:  Excel V (532nm)  Treatment number:  1 with Excel V (3 total including this treatment with laser)  Fluence (J/sq cm):  5.6  Spot size (mm):  7  Pulse duration (ms):  3  Pulse stacking: Yes    Dynamic cooling: Yes to 5 degrees celsius  Surface area treated (sq cm):  1.5  Outcome: patient tolerated procedure well with no complications    Additional details:  Pulse # 7    Consider 3rd treatment, however would suspect if no change at follow up further treatments unlikely to be beneficial.     Patient elected to be treated by dermatology resident Dr. Willi Desai with resident discount under supervision of Dr. Adriana Lord  "

## 2025-06-11 NOTE — PROGRESS NOTES
Subjective     Nany Gtz is a 60 y.o. female who presents for the following: Cosmetic (Pt here for #1 Excel-V treatment for Keloid, located left shoulder anterior. She has tried V-beam x2, ILK x4 with improvement of itching and thickness. She reports the center remains brighter red/pink in color. ).     Intake Questions  Do you have any new or changing Lesions?: No  Where are these new or changing lesion(s) located?: NA  For patients coming in for a Follow-up Visit:  Have there been any changes in your health since your last visit?: all in mychart  Are you an organ transplant recipient?: No  Have you had or do you have a Staph Infection?: No  Have you had or do you have Vacular Disease?: No  Do you use sunscreen?: Daily  Do you use a tanning bed?: No  Are you trying to get pregnant?: No  Are you on birth control?: Yes  If on birth control, what type?: BCP  Do you have irregular menstrual cycles?: No    Review of Systems:  No other skin or systemic complaints other than what is documented elsewhere in the note.    The following portions of the chart were reviewed this encounter and updated as appropriate:          Skin Cancer History  Biopsy Log Book  Biopsied Type Location Status   4/25/24 BCC, Superficial Left Shoulder - Anterior Treatment Complete  4/30/24       Additional History      Specialty Problems          Dermatology Problems    BCC (basal cell carcinoma of skin)        Objective   Well appearing patient in no apparent distress; mood and affect are within normal limits.    A focused skin examination was performed of the left shoulder. All findings within normal limits unless otherwise noted below.    Assessment/Plan   Skin Exam  1. KELOID  Left Shoulder - Anterior  Pink scar with prominent vessels on the left anterior shoulder, slightly improved from 4/29/25  Keloids are thick, raised scars that often occur secondary to trauma, surgery, piercings and at times may occur spontaneously.  Treatments are  "limited, however often do respond to intralesional kenalog(ILK). Keloids often require multiple intralesional kenalog treatments  Occasionally keloids can \"removed\" surgically, however they have a risk of recurrence and still often require intralesional kenalog after removal to prevent recurrence.     S/p 4 ILK treatments - improvement in thickness    S/p 2 PDL treatment, PDL denied by insurance with some improvement    Laser Treatment:  Destruction of CVP lesion - Left Shoulder - Anterior - telangiectasias  Lesion area (sq cm):  1.5  Informed consent: discussed and consent obtained    Timeout: patient name, date of birth, surgical site, and procedure verified    Procedure prep:  Patient prepped in sterile fashion  Prep type:  cooling gel  Eye shield: goggles    Laser type:  Excel V (532nm)  Treatment number:  1 with Excel V (3 total including this treatment with laser)  Fluence (J/sq cm):  5.6  Spot size (mm):  7  Pulse duration (ms):  3  Pulse stacking: Yes    Dynamic cooling: Yes to 5 degrees celsius  Surface area treated (sq cm):  1.5  Outcome: patient tolerated procedure well with no complications    Additional details:  Pulse # 7    Consider 3rd treatment, however would suspect if no change at follow up further treatments unlikely to be beneficial.     Patient elected to be treated by dermatology resident Dr. Willi Desai with resident discount under supervision of Dr. Adriana oLrd    Follow up in in 6-8 weeks as needed    Willi Desai MD   PGY4, Department of Dermatology    I was present for the entirety of the procedure(s).  I saw and evaluated the patient. I personally obtained the key and critical portions of the history and physical exam or was physically present for key and critical portions performed by the resident/fellow. I reviewed the resident/fellow's documentation and discussed the patient with the resident/fellow. I agree with the resident/fellow's medical decision making as documented in the " note.    Adriana Lord, DO

## 2025-07-16 ENCOUNTER — APPOINTMENT (OUTPATIENT)
Dept: DERMATOLOGY | Facility: CLINIC | Age: 60
End: 2025-07-16
Payer: COMMERCIAL

## 2025-07-16 DIAGNOSIS — L91.0 KELOID: Primary | ICD-10-CM

## 2025-07-16 DIAGNOSIS — Z41.9 ELECTIVE SURGERY FOR PURPOSES OTHER THAN TREATING HEALTH CONDITIONS: ICD-10-CM

## 2025-07-16 PROCEDURE — DTLTS LASER TEST SPOT: Performed by: DERMATOLOGY

## 2025-07-16 NOTE — PROGRESS NOTES
"Subjective     Nany Gtz is a 60 y.o. female who presents for the following: Cosmetic (Month - LV 06/11/25 - Cosmetic (Pt here for laser treatment s/p 1 Excel-V treatment for Keloid and 2 PDL for keloid, located left shoulder anterior. Also s/p ILK x4 with improvement of itching and thickness. She reports the center is improving with color of red/pink in color. ). ).          Review of Systems:  No other skin or systemic complaints other than what is documented elsewhere in the note.    The following portions of the chart were reviewed this encounter and updated as appropriate:          Skin Cancer History  Biopsy Log Book  Biopsied Type Location Status   4/25/24 BCC, Superficial Left Shoulder - Anterior Treatment Complete  4/30/24       Additional History      Specialty Problems          Dermatology Problems    BCC (basal cell carcinoma of skin)        Objective   Well appearing patient in no apparent distress; mood and affect are within normal limits.    A focused skin examination was performed of the left shoulder. All findings within normal limits unless otherwise noted below.    Assessment/Plan   Skin Exam  1. KELOID  Left Shoulder - Anterior  Pink scar with prominent vessels on the left anterior shoulder, slightly improved from 4/29/25  Keloids are thick, raised scars that often occur secondary to trauma, surgery, piercings and at times may occur spontaneously.  Treatments are limited, however often do respond to intralesional kenalog(ILK). Keloids often require multiple intralesional kenalog treatments  Occasionally keloids can \"removed\" surgically, however they have a risk of recurrence and still often require intralesional kenalog after removal to prevent recurrence.     S/p 4 ILK treatments - improvement in thickness    S/p 2 PDL treatment, PDL denied by insurance with some improvement    S/p 1 [place]-V    Laser Treatment:  Destruction of CVP lesion - Left Shoulder - Anterior - telangiectasias  Lesion " area (sq cm):  1.5  Informed consent: discussed and consent obtained    Timeout: patient name, date of birth, surgical site, and procedure verified    Procedure prep:  Patient prepped in sterile fashion  Prep type:  cooling gel  Eye shield: goggles    Laser type:  Excel V (532nm)  Treatment number:  2 with Excel V (4 total including this treatment with laser)  Fluence (J/sq cm):  6.0  Spot size (mm):  7  Pulse duration (ms):  3  Pulse stacking: Yes    Dynamic cooling: Yes to 5 degrees celsius  Surface area treated (sq cm):  1.5  Outcome: patient tolerated procedure well with no complications    Additional details:  8 pulses    Consider repeat treatment in 6-8 weeks pending appearance of scar at next visit      Follow up in 6-8 weeks    Kavon Tellez MD  PGY-3, Dermatology    I was present for the entirety of the procedure(s).  I performed the procedureI saw and evaluated the patient. I personally obtained the key and critical portions of the history and physical exam or was physically present for key and critical portions performed by the resident/fellow. I reviewed the resident/fellow's documentation and discussed the patient with the resident/fellow. I agree with the resident/fellow's medical decision making as documented in the note.    Adriana Lord DO

## 2025-07-16 NOTE — Clinical Note
"Keloids are thick, raised scars that often occur secondary to trauma, surgery, piercings and at times may occur spontaneously.  Treatments are limited, however often do respond to intralesional kenalog(ILK). Keloids often require multiple intralesional kenalog treatments  Occasionally keloids can \"removed\" surgically, however they have a risk of recurrence and still often require intralesional kenalog after removal to prevent recurrence.     S/p 4 ILK treatments - improvement in thickness    S/p 2 PDL treatment, PDL denied by insurance with some improvement    S/p 1 [place]-V    Laser Treatment:  Destruction of CVP lesion - Left Shoulder - Anterior - telangiectasias  Lesion area (sq cm):  1.5  Informed consent: discussed and consent obtained    Timeout: patient name, date of birth, surgical site, and procedure verified    Procedure prep:  Patient prepped in sterile fashion  Prep type:  cooling gel  Eye shield: goggles    Laser type:  Excel V (532nm)  Treatment number:  2 with Excel V (4 total including this treatment with laser)  Fluence (J/sq cm):  6.0  Spot size (mm):  7  Pulse duration (ms):  3  Pulse stacking: Yes    Dynamic cooling: Yes to 5 degrees celsius, cool gel  Surface area treated (sq cm):  1.5  Outcome: patient tolerated procedure well with no complications    Additional details:  8 pulses    Consider repeat treatment in 6-8 weeks pending appearance of scar at next visit    "

## 2025-10-08 ENCOUNTER — APPOINTMENT (OUTPATIENT)
Dept: DERMATOLOGY | Facility: CLINIC | Age: 60
End: 2025-10-08
Payer: COMMERCIAL